# Patient Record
Sex: FEMALE | Race: WHITE | NOT HISPANIC OR LATINO | Employment: FULL TIME | URBAN - METROPOLITAN AREA
[De-identification: names, ages, dates, MRNs, and addresses within clinical notes are randomized per-mention and may not be internally consistent; named-entity substitution may affect disease eponyms.]

---

## 2017-01-19 ENCOUNTER — HOSPITAL ENCOUNTER (OUTPATIENT)
Dept: RADIOLOGY | Facility: OTHER | Age: 37
Discharge: HOME | End: 2017-01-19
Attending: DERMATOLOGY
Payer: COMMERCIAL

## 2017-01-19 DIAGNOSIS — J22 ACUTE LOWER RESPIRATORY INFECTION: ICD-10-CM

## 2017-01-19 PROCEDURE — 71020 XR CHEST PA AND LATERAL: CPT | Mod: TC

## 2017-01-19 PROCEDURE — 71020 XR CHEST PA AND LATERAL: CPT | Mod: 26,,, | Performed by: RADIOLOGY

## 2017-08-01 ENCOUNTER — HOSPITAL ENCOUNTER (EMERGENCY)
Facility: OTHER | Age: 37
Discharge: HOME | End: 2017-08-01
Attending: EMERGENCY MEDICINE
Payer: COMMERCIAL

## 2017-08-01 VITALS
RESPIRATION RATE: 16 BRPM | HEIGHT: 60 IN | SYSTOLIC BLOOD PRESSURE: 135 MMHG | WEIGHT: 150 LBS | OXYGEN SATURATION: 96 % | DIASTOLIC BLOOD PRESSURE: 94 MMHG | HEART RATE: 91 BPM | BODY MASS INDEX: 29.45 KG/M2 | TEMPERATURE: 98 F

## 2017-08-01 DIAGNOSIS — H66.012 ACUTE SUPPURATIVE OTITIS MEDIA OF LEFT EAR WITH SPONTANEOUS RUPTURE OF TYMPANIC MEMBRANE, RECURRENCE NOT SPECIFIED: Primary | ICD-10-CM

## 2017-08-01 LAB
B-HCG UR QL: NEGATIVE
CTP QC/QA: YES

## 2017-08-01 PROCEDURE — 25000003 PHARM REV CODE 250: Performed by: EMERGENCY MEDICINE

## 2017-08-01 PROCEDURE — 99284 EMERGENCY DEPT VISIT MOD MDM: CPT | Mod: 25

## 2017-08-01 PROCEDURE — 81025 URINE PREGNANCY TEST: CPT | Performed by: EMERGENCY MEDICINE

## 2017-08-01 RX ORDER — TRAZODONE HYDROCHLORIDE 100 MG/1
100 TABLET ORAL NIGHTLY
COMMUNITY

## 2017-08-01 RX ORDER — BUPROPION HYDROCHLORIDE 300 MG/1
300 TABLET ORAL DAILY
COMMUNITY

## 2017-08-01 RX ORDER — OXYCODONE AND ACETAMINOPHEN 5; 325 MG/1; MG/1
1 TABLET ORAL EVERY 8 HOURS PRN
Qty: 12 TABLET | Refills: 0 | Status: SHIPPED | OUTPATIENT
Start: 2017-08-01

## 2017-08-01 RX ORDER — IBUPROFEN 800 MG/1
800 TABLET ORAL EVERY 6 HOURS PRN
Qty: 12 TABLET | Refills: 0 | Status: SHIPPED | OUTPATIENT
Start: 2017-08-01

## 2017-08-01 RX ORDER — IBUPROFEN 400 MG/1
800 TABLET ORAL
Status: COMPLETED | OUTPATIENT
Start: 2017-08-01 | End: 2017-08-01

## 2017-08-01 RX ORDER — AMOXICILLIN AND CLAVULANATE POTASSIUM 875; 125 MG/1; MG/1
1 TABLET, FILM COATED ORAL 2 TIMES DAILY
Qty: 20 TABLET | Refills: 0 | Status: SHIPPED | OUTPATIENT
Start: 2017-08-01 | End: 2017-08-11

## 2017-08-01 RX ADMIN — IBUPROFEN 800 MG: 400 TABLET, FILM COATED ORAL at 12:08

## 2017-08-01 NOTE — ED PROVIDER NOTES
Encounter Date: 8/1/2017    SCRIBE #1 NOTE: I, Patricia Worley, am scribing for, and in the presence of, Dr. Saravia.       History     Chief Complaint   Patient presents with    Otalgia     Pt CO pain and bleeding to left ear.     Time seen by provider: 11:56 AM    This is a 36 y.o. female who presents to the ED with complaint of left ear pain that began one week ago. The pain is described as constant, aching and rates a 5/10. She reports associate blood-tinged discharge out of her left ear, as well as decreased hearing, congestion, and rhinorrhea. Patient denies any fever, chills, sore throat, nausea, or vomiting. She also denies any recent trauma or alleviating factors, and states that she has not experienced similar ear pain in the past. She admits to occasional consumption of alcohol.       The history is provided by the patient.     Review of patient's allergies indicates:   Allergen Reactions    Sulfa (sulfonamide antibiotics) Hives     Past Medical History:   Diagnosis Date    Allergy     Cancer 1992    (AML)LEUKEMIA    Eczema     Migraine headache      Past Surgical History:   Procedure Laterality Date    BONE MARROW TRANSPLANT  1992    SINUS SURGERY  12/2008    FESS     History reviewed. No pertinent family history.  Social History   Substance Use Topics    Smoking status: Former Smoker     Types: Cigarettes    Smokeless tobacco: Never Used      Comment: social smoker    Alcohol use Yes      Comment: socially     Review of Systems   Constitutional: Negative for chills and fever.   HENT: Positive for congestion, ear discharge, ear pain (left), hearing loss (left) and rhinorrhea. Negative for sneezing and sore throat.    Respiratory: Negative for cough and shortness of breath.    Cardiovascular: Negative for chest pain.   Gastrointestinal: Negative for abdominal pain, nausea and vomiting.   Genitourinary: Negative for dysuria.   Musculoskeletal: Negative for back pain and myalgias.   Skin: Negative  for rash.   Neurological: Negative for weakness.   Hematological: Does not bruise/bleed easily.       Physical Exam     Initial Vitals [08/01/17 1123]   BP Pulse Resp Temp SpO2   133/76 100 18 97.4 °F (36.3 °C) 99 %      MAP       95         Physical Exam    Nursing note and vitals reviewed.  Constitutional: She appears well-developed and well-nourished. She is not diaphoretic. No distress.   HENT:   Head: Normocephalic and atraumatic.   Right Ear: Tympanic membrane and ear canal normal.   Left Ear: Ear canal normal.   Mouth/Throat: Oropharynx is clear and moist.   Left TM: Ruptured.    Eyes: Conjunctivae and EOM are normal. Pupils are equal, round, and reactive to light. No scleral icterus.   Neck: Normal range of motion. Neck supple.   Cardiovascular: Normal rate, regular rhythm, S1 normal, S2 normal and normal heart sounds. Exam reveals no gallop and no friction rub.    No murmur heard.  Pulmonary/Chest: Breath sounds normal. No respiratory distress. She has no wheezes. She has no rhonchi. She has no rales.   Abdominal: Soft. Bowel sounds are normal. There is no tenderness. There is no rebound and no guarding.   Musculoskeletal: Normal range of motion. She exhibits no edema or tenderness.   No lower extremity edema.    Lymphadenopathy:     She has no cervical adenopathy.   Neurological: She is alert and oriented to person, place, and time.   Skin: Skin is warm and dry. No rash noted. No pallor.   Psychiatric: She has a normal mood and affect. Her behavior is normal. Judgment and thought content normal.         ED Course   Procedures  Labs Reviewed   POCT URINE PREGNANCY             Medical Decision Making:   Clinical Tests:   Lab Tests: Ordered and Reviewed            Scribe Attestation:   Scribe #1: I performed the above scribed service and the documentation accurately describes the services I performed. I attest to the accuracy of the note.    Attending Attestation:           Physician Attestation for  Scribe:  Physician Attestation Statement for Scribe #1: I, Dr. Alvarado, reviewed documentation, as scribed by Patricia Worley in my presence, and it is both accurate and complete.         Attending ED Notes:   Urgent evaluation a 36-year-old female with left ear pain with drainage.  Patient is afebrile, nontoxic-appearing with stable vital signs.  On examination patient has ruptured tympanic membrane secondary to otitis media.  The patient is extensively counseled on her diagnosis and treatment, discharged in good condition and directed follow-up his PCP and ENT in the next 24-48 hours.          ED Course     Clinical Impression:     1. Acute suppurative otitis media of left ear with spontaneous rupture of tympanic membrane, recurrence not specified                                 Ti Alvarado MD  08/01/17 2032

## 2017-08-01 NOTE — ED TRIAGE NOTES
Pt c/o left ear pain which started this morning with sanguinous drainage.Pt reports hearing decreased in the left ear. Pt reports that she has had cold symptoms X 1 week.

## 2017-08-01 NOTE — ED NOTES
Patient Identifiers for Lucy Tubbs checked and correct  LOC: The patient is awake, alert and aware of environment with an appropriate affect, the patient is oriented x 3 and speaking appropriate.  APPEARANCE: Patient resting comfortably and in no acute distress. The patient is clean and well groomed. The patient's clothing is properly fastened.  SKIN: The skin is warm and dry. The patient has normal skin turgor and moist mucus membranes.   Musculoskeletal :  Normal range of motion noted. Moves all extremities well  RESPIRATORY: Airway is open and patent, respirations are spontaneous, patient has a normal effort and rate. Breath sounds are clear & equal, bilaterally.  PULSES: 2+ radial pulses, symmetrical in all extremities.  ENT: Right TM intact. Left TM ruputed.    Will continue to monitor

## 2018-06-15 ENCOUNTER — HOSPITAL ENCOUNTER (OUTPATIENT)
Dept: RADIOLOGY | Facility: OTHER | Age: 38
Discharge: HOME | End: 2018-06-15
Attending: INTERNAL MEDICINE
Payer: COMMERCIAL

## 2018-06-15 DIAGNOSIS — R13.10 DYSPHAGIA, UNSPECIFIED: ICD-10-CM

## 2018-06-15 PROCEDURE — 74220 X-RAY XM ESOPHAGUS 1CNTRST: CPT | Mod: TC

## 2018-06-15 PROCEDURE — 74220 X-RAY XM ESOPHAGUS 1CNTRST: CPT | Mod: 26,,, | Performed by: RADIOLOGY

## 2024-11-14 ENCOUNTER — OFFICE VISIT (OUTPATIENT)
Dept: PRIMARY CARE CLINIC | Facility: CLINIC | Age: 44
End: 2024-11-14
Payer: COMMERCIAL

## 2024-11-14 VITALS
WEIGHT: 147.94 LBS | SYSTOLIC BLOOD PRESSURE: 132 MMHG | DIASTOLIC BLOOD PRESSURE: 80 MMHG | HEART RATE: 88 BPM | BODY MASS INDEX: 29.04 KG/M2 | HEIGHT: 60 IN | OXYGEN SATURATION: 98 % | RESPIRATION RATE: 16 BRPM

## 2024-11-14 DIAGNOSIS — Z12.31 ENCOUNTER FOR SCREENING MAMMOGRAM FOR MALIGNANT NEOPLASM OF BREAST: ICD-10-CM

## 2024-11-14 DIAGNOSIS — Z13.5 SCREENING FOR EYE CONDITION: ICD-10-CM

## 2024-11-14 DIAGNOSIS — Z11.4 SCREENING FOR HIV (HUMAN IMMUNODEFICIENCY VIRUS): ICD-10-CM

## 2024-11-14 DIAGNOSIS — Z94.81 S/P AUTOLOGOUS BONE MARROW TRANSPLANTATION: ICD-10-CM

## 2024-11-14 DIAGNOSIS — K21.9 GASTROESOPHAGEAL REFLUX DISEASE WITHOUT ESOPHAGITIS: ICD-10-CM

## 2024-11-14 DIAGNOSIS — Z13.6 ENCOUNTER FOR LIPID SCREENING FOR CARDIOVASCULAR DISEASE: ICD-10-CM

## 2024-11-14 DIAGNOSIS — Z00.00 ENCOUNTER FOR MEDICAL EXAMINATION TO ESTABLISH CARE: Primary | ICD-10-CM

## 2024-11-14 DIAGNOSIS — Z13.220 ENCOUNTER FOR LIPID SCREENING FOR CARDIOVASCULAR DISEASE: ICD-10-CM

## 2024-11-14 DIAGNOSIS — C92.01 ACUTE MYELOID LEUKEMIA IN REMISSION: ICD-10-CM

## 2024-11-14 DIAGNOSIS — Z13.1 SCREENING FOR DIABETES MELLITUS: ICD-10-CM

## 2024-11-14 DIAGNOSIS — Z11.59 ENCOUNTER FOR HEPATITIS C SCREENING TEST FOR LOW RISK PATIENT: ICD-10-CM

## 2024-11-14 DIAGNOSIS — Z12.4 SCREENING FOR CERVICAL CANCER: ICD-10-CM

## 2024-11-14 PROCEDURE — 3079F DIAST BP 80-89 MM HG: CPT | Mod: CPTII,S$GLB,, | Performed by: NURSE PRACTITIONER

## 2024-11-14 PROCEDURE — 99386 PREV VISIT NEW AGE 40-64: CPT | Mod: S$GLB,,, | Performed by: NURSE PRACTITIONER

## 2024-11-14 PROCEDURE — 99999 PR PBB SHADOW E&M-NEW PATIENT-LVL IV: CPT | Mod: PBBFAC,,, | Performed by: NURSE PRACTITIONER

## 2024-11-14 PROCEDURE — 3075F SYST BP GE 130 - 139MM HG: CPT | Mod: CPTII,S$GLB,, | Performed by: NURSE PRACTITIONER

## 2024-11-14 PROCEDURE — 1159F MED LIST DOCD IN RCRD: CPT | Mod: CPTII,S$GLB,, | Performed by: NURSE PRACTITIONER

## 2024-11-14 PROCEDURE — 1160F RVW MEDS BY RX/DR IN RCRD: CPT | Mod: CPTII,S$GLB,, | Performed by: NURSE PRACTITIONER

## 2024-11-14 PROCEDURE — 3008F BODY MASS INDEX DOCD: CPT | Mod: CPTII,S$GLB,, | Performed by: NURSE PRACTITIONER

## 2024-11-14 RX ORDER — ESCITALOPRAM OXALATE 10 MG/1
10 TABLET ORAL DAILY
COMMUNITY

## 2024-11-14 RX ORDER — PANTOPRAZOLE SODIUM 40 MG/1
40 TABLET, DELAYED RELEASE ORAL DAILY
Qty: 90 TABLET | Refills: 3 | Status: SHIPPED | OUTPATIENT
Start: 2024-11-14 | End: 2025-11-14

## 2024-11-14 NOTE — PROGRESS NOTES
[facility] Note    Chief Complaint      Chief Complaint   Patient presents with    Establish Care     History of Present Illness      Lucy Tubbs is a 44 y.o. female patient with chronic conditions of anxiety, ADHD who presents today for [facility] care  Works for [employer]  In derm- MOS    Labs- ordered  Eye exams- order  Gyn- order  Mammogram- order    Vaccines:    History of Present Illness    CHIEF COMPLAINT:  Lucy presents for an annual wellness visit and to establish care with a new provider after switching insurance.    HPI:  Lucy started care on  after switching insurance and losing her previous provider. She has ovarian failure due to leukemia treatment as a child, has never had spontaneous menstruation, and required hormonal contraception to induce menstrual cycles. She is unable to conceive children. Lucy is currently taking Xanax, Adderall, and Lexapro. Her previous provider discontinued Wellbutrin and initiated Lexapro, which causes hot flashes. Lucy prefers Wellbutrin, which she had been taking for an extended period. Her providers were concerned about her blood pressure, which she attributes to stress. Her current blood pressure is 132/80. Lucy also takes pantoprazole 40 mg.    Lucy denies any current menstrual problems, urination issues, or bowel movement concerns.    MEDICATIONS:  Lucy is on Xanax and Adderall. She is also taking Lexapro, reporting hot flashes as a side effect. She is on Pantoprazole 40 mg.    MEDICAL HISTORY:  Lucy has a history of childhood leukemia. She also has ovarian failure since childhood, which is due to her leukemia treatment. Lucy has never had spontaneous menstruation. Her last menstrual period is unknown. She is in menopause due to ovarian failure. Lucy has not had a hysterectomy. A referral has been placed for a gynecologist for her last Pap smear. She has previously used hormonal contraception to induce menstruation. Lucy is  and  is unable to conceive due to ovarian failure. She has never been on hormone replacement therapy as it is contraindicated due to ovarian failure from childhood leukemia treatment. Lucy has received a flu vaccine and reports being up to date on vaccinations.    FAMILY HISTORY:  Family history is significant for mother with Alzheimer's disease.    ALLERGIES:  Lucy is allergic to Sulfa.    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 She recently , with her spouse leaving in January.      ROS:  General: -change in weight  Genitourinary: -difficulty urinating  Endocrine: +hot flashes         Health Maintenance   Topic Date Due    Hepatitis C Screening  Never done    Mammogram  Never done    TETANUS VACCINE  07/10/2011    Lipid Panel  Completed       Past Medical History:   Diagnosis Date    Allergy     Cancer 1992    (AML)LEUKEMIA    Eczema     Migraine headache        Past Surgical History:   Procedure Laterality Date    BONE MARROW TRANSPLANT  1992    SINUS SURGERY  12/2008    FESS       family history is not on file.    Social History     Tobacco Use    Smoking status: Former     Types: Cigarettes    Smokeless tobacco: Never    Tobacco comments:     social smoker   Substance Use Topics    Alcohol use: Yes     Comment: socially    Drug use: No       Outpatient Encounter Medications as of 11/14/2024   Medication Sig Dispense Refill    ALPRAZolam (XANAX) 0.5 MG tablet Take 1 tablet (0.5 mg total) by mouth once daily. 30 tablet 0    dextroamphetamine-amphetamine (ADDERALL XR) 25 MG 24 hr capsule Take 1 capsule (25 mg total) by mouth every morning. 30 capsule 0    dextroamphetamine-amphetamine (ADDERALL) 15 mg tablet Take 1 tablet (15 mg total) by mouth 2 (two) times a day. 30 tablet 0    dextroamphetamine-amphetamine (ADDERALL) 15 mg tablet Take 1 tablet (15 mg total) by mouth 2 (two) times a day. 30 tablet 0    EScitalopram oxalate (LEXAPRO) 10 MG tablet Take 10 mg by mouth once  daily.      buPROPion (WELLBUTRIN XL) 300 MG 24 hr tablet Take 300 mg by mouth once daily. (Patient not taking: Reported on 11/14/2024)      pantoprazole (PROTONIX) 40 MG tablet Take 1 tablet (40 mg total) by mouth once daily. 90 tablet 3    [DISCONTINUED] acyclovir (ZOVIRAX) 400 MG tablet Take 1 tablet (400 mg total) by mouth 3 (three) times daily for 5 days 15 tablet 4    [DISCONTINUED] cetirizine-pseudoephedrine (ZYRTEC-D) 5-120 mg Tb12 Take 1 tablet by mouth once daily.      [DISCONTINUED] fluticasone (FLONASE) 50 mcg/actuation nasal spray 2 sprays by Each Nare route daily as needed for Rhinitis.      [DISCONTINUED] ibuprofen (ADVIL,MOTRIN) 800 MG tablet Take 1 tablet (800 mg total) by mouth every 6 (six) hours as needed for Pain. 12 tablet 0    [DISCONTINUED] multivitamin capsule Take 1 capsule by mouth once daily.      [DISCONTINUED] oxycodone-acetaminophen (PERCOCET) 5-325 mg per tablet Take 1 tablet by mouth every 8 (eight) hours as needed for Pain. 12 tablet 0    [DISCONTINUED] trazodone (DESYREL) 100 MG tablet Take 100 mg by mouth every evening.       No facility-administered encounter medications on file as of 11/14/2024.        Review of patient's allergies indicates:   Allergen Reactions    Sulfa (sulfonamide antibiotics) Hives       Physical Exam      Vital Signs  Pulse: 88  Resp: 16  SpO2: 98 %  BP: 132/80  BP Location: Right arm  Patient Position: Sitting  Height and Weight  Height: 5' (152.4 cm)  Weight: 67.1 kg (147 lb 14.9 oz)  BSA (Calculated - sq m): 1.69 sq meters  BMI (Calculated): 28.9  Weight in (lb) to have BMI = 25: 127.7    Physical Exam  Vitals and nursing note reviewed.   Constitutional:       General: She is not in acute distress.     Appearance: Normal appearance. She is well-developed. She is not ill-appearing.   HENT:      Head: Normocephalic and atraumatic.      Right Ear: External ear normal.      Left Ear: External ear normal.   Eyes:      Conjunctiva/sclera: Conjunctivae normal.       Pupils: Pupils are equal, round, and reactive to light.   Neck:      Thyroid: No thyromegaly.      Vascular: No JVD.      Trachea: No tracheal deviation.   Cardiovascular:      Rate and Rhythm: Normal rate and regular rhythm.      Heart sounds: Normal heart sounds. No murmur heard.  Pulmonary:      Effort: Pulmonary effort is normal.      Breath sounds: Normal breath sounds.   Chest:      Chest wall: No tenderness.   Abdominal:      General: Bowel sounds are normal.      Palpations: Abdomen is soft.      Tenderness: There is no abdominal tenderness. There is no guarding.   Musculoskeletal:         General: Normal range of motion.      Cervical back: Normal range of motion and neck supple.   Lymphadenopathy:      Cervical: No cervical adenopathy.   Skin:     General: Skin is warm and dry.   Neurological:      General: No focal deficit present.      Mental Status: She is alert and oriented to person, place, and time.   Psychiatric:         Mood and Affect: Mood normal.         Behavior: Behavior normal.         Thought Content: Thought content normal.         Judgment: Judgment normal.          Laboratory:  CBC:  Lab Results   Component Value Date    WBC 8.15 04/18/2016    RBC 4.07 04/18/2016    HGB 12.6 04/18/2016    HCT 38.6 04/18/2016     04/18/2016    MCV 95 04/18/2016    MCH 31.0 04/18/2016    MCHC 32.6 04/18/2016    MCHC 33.8 06/20/2014    MCHC 32.9 12/22/2008     CMP:  Lab Results   Component Value Date    GLU 75 08/26/2022    CALCIUM 9.6 08/26/2022    ALBUMIN 4.6 08/26/2022    PROT 6.9 04/18/2016     08/26/2022    K 4.9 08/26/2022    CO2 25 08/26/2022     04/18/2016    BUN 17 08/26/2022    ALKPHOS 52 08/26/2022    ALT 34 (H) 08/26/2022    AST 35 (H) 08/26/2022    BILITOT 0.4 08/26/2022    BILITOT 0.3 04/18/2016    BILITOT 0.5 06/20/2014     URINALYSIS:  Lab Results   Component Value Date    COLORU Yellow 04/18/2016    SPECGRAV <=1.005 (A) 04/18/2016    PHUR 6.0 04/18/2016    PROTEINUA  Negative 04/18/2016    NITRITE Negative 04/18/2016    LEUKOCYTESUR Negative 04/18/2016    UROBILINOGEN Negative 04/18/2016      LIPIDS:  Lab Results   Component Value Date    TSH 1.447 06/20/2014     TSH:  Lab Results   Component Value Date    TSH 1.447 06/20/2014     A1C:  Lab Results   Component Value Date    HGBA1C 5.4 05/12/2021         Assessment/Plan     Lucy Tubbs is a 44 y.o.female with:    Assessment & Plan    Ordered comprehensive lab panel including CBC, CMP, A1C, Hep C, HIV, and thyroid tests to assess overall health status  Recommend first mammogram given patient's age and lack of prior screening  Considered adjusting psychiatric medications based on patient's report of adverse effects from Lexapro and preference for Wellbutrin  Noted patient's history of ovarian failure due to childhood leukemia treatment  Assessed blood pressure as normal at 132/80, despite previous concerns    GASTRO-ESOPHAGEAL REFLUX DISEASE:  Started pantoprazole 40 mg.    SCREENING MAMMOGRAM:  Explained mammogram procedure and advised to inform technician it is the first screening.  Ordered mammogram.    GYNECOLOGICAL EXAMINATION:  Referred to Gynecology.    GENERAL HEALTH SCREENING:  Ordered CBC, CMP, A1C, Hep C, HIV, and thyroid tests.  Fast for 8 hours before lab work, stopping food/drink intake around 10 PM.  Lucy may have water before lab work.  Follow up for fasting lab work tomorrow morning at 7 or 8 AM.  Check in at the  on the first floor for lab work, no appointment needed.    OPHTHALMOLOGICAL EXAMINATION:  Referred to Ophthalmology.    GENERAL INSTRUCTIONS:  Discussed potential for food options to improve once mall area opens.  Contact office through patient portal for any needs.         Encounter for medical examination to establish care  -     CBC Auto Differential; Future; Expected date: 11/14/2024  -     Comprehensive Metabolic Panel; Future; Expected date: 11/14/2024  -     Hemoglobin A1C; Future;  Expected date: 11/14/2024  -     Hepatitis C Antibody; Future; Expected date: 11/14/2024  -     HIV 1/2 Ag/Ab (4th Gen); Future; Expected date: 11/14/2024  -     TSH; Future; Expected date: 11/14/2024  -     T4, Free; Future; Expected date: 11/14/2024  -     Lipid Panel; Future; Expected date: 11/14/2024    Gastroesophageal reflux disease without esophagitis  -     CBC Auto Differential; Future; Expected date: 11/14/2024  -     Comprehensive Metabolic Panel; Future; Expected date: 11/14/2024  -     Hemoglobin A1C; Future; Expected date: 11/14/2024  -     Hepatitis C Antibody; Future; Expected date: 11/14/2024  -     HIV 1/2 Ag/Ab (4th Gen); Future; Expected date: 11/14/2024  -     TSH; Future; Expected date: 11/14/2024  -     T4, Free; Future; Expected date: 11/14/2024  -     Lipid Panel; Future; Expected date: 11/14/2024  -     pantoprazole (PROTONIX) 40 MG tablet; Take 1 tablet (40 mg total) by mouth once daily.  Dispense: 90 tablet; Refill: 3    Acute myeloid leukemia in remission  -     CBC Auto Differential; Future; Expected date: 11/14/2024  -     Comprehensive Metabolic Panel; Future; Expected date: 11/14/2024  -     Hemoglobin A1C; Future; Expected date: 11/14/2024  -     Hepatitis C Antibody; Future; Expected date: 11/14/2024  -     HIV 1/2 Ag/Ab (4th Gen); Future; Expected date: 11/14/2024  -     TSH; Future; Expected date: 11/14/2024  -     T4, Free; Future; Expected date: 11/14/2024  -     Lipid Panel; Future; Expected date: 11/14/2024    S/P autologous bone marrow transplantation  -     CBC Auto Differential; Future; Expected date: 11/14/2024  -     Comprehensive Metabolic Panel; Future; Expected date: 11/14/2024  -     Hemoglobin A1C; Future; Expected date: 11/14/2024  -     Hepatitis C Antibody; Future; Expected date: 11/14/2024  -     HIV 1/2 Ag/Ab (4th Gen); Future; Expected date: 11/14/2024  -     TSH; Future; Expected date: 11/14/2024  -     T4, Free; Future; Expected date: 11/14/2024  -     Lipid  Panel; Future; Expected date: 11/14/2024    Screening for eye condition  -     CBC Auto Differential; Future; Expected date: 11/14/2024  -     Comprehensive Metabolic Panel; Future; Expected date: 11/14/2024  -     Hemoglobin A1C; Future; Expected date: 11/14/2024  -     Hepatitis C Antibody; Future; Expected date: 11/14/2024  -     HIV 1/2 Ag/Ab (4th Gen); Future; Expected date: 11/14/2024  -     TSH; Future; Expected date: 11/14/2024  -     T4, Free; Future; Expected date: 11/14/2024  -     Lipid Panel; Future; Expected date: 11/14/2024  -     Ambulatory referral/consult to Optometry; Future; Expected date: 11/14/2024    Screening for cervical cancer  -     Ambulatory referral/consult to Gynecology; Future; Expected date: 11/14/2024    Encounter for screening mammogram for malignant neoplasm of breast  -     Mammo Digital Screening Bilat w/ Bert; Future; Expected date: 11/14/2024    Screening for diabetes mellitus  -     CBC Auto Differential; Future; Expected date: 11/14/2024  -     Comprehensive Metabolic Panel; Future; Expected date: 11/14/2024  -     Hemoglobin A1C; Future; Expected date: 11/14/2024  -     Hepatitis C Antibody; Future; Expected date: 11/14/2024  -     HIV 1/2 Ag/Ab (4th Gen); Future; Expected date: 11/14/2024  -     TSH; Future; Expected date: 11/14/2024  -     T4, Free; Future; Expected date: 11/14/2024  -     Lipid Panel; Future; Expected date: 11/14/2024    Encounter for hepatitis C screening test for low risk patient  -     CBC Auto Differential; Future; Expected date: 11/14/2024  -     Comprehensive Metabolic Panel; Future; Expected date: 11/14/2024  -     Hemoglobin A1C; Future; Expected date: 11/14/2024  -     Hepatitis C Antibody; Future; Expected date: 11/14/2024  -     HIV 1/2 Ag/Ab (4th Gen); Future; Expected date: 11/14/2024  -     TSH; Future; Expected date: 11/14/2024  -     T4, Free; Future; Expected date: 11/14/2024  -     Lipid Panel; Future; Expected date:  11/14/2024    Screening for HIV (human immunodeficiency virus)  -     CBC Auto Differential; Future; Expected date: 11/14/2024  -     Comprehensive Metabolic Panel; Future; Expected date: 11/14/2024  -     Hemoglobin A1C; Future; Expected date: 11/14/2024  -     Hepatitis C Antibody; Future; Expected date: 11/14/2024  -     HIV 1/2 Ag/Ab (4th Gen); Future; Expected date: 11/14/2024  -     TSH; Future; Expected date: 11/14/2024  -     T4, Free; Future; Expected date: 11/14/2024  -     Lipid Panel; Future; Expected date: 11/14/2024    Encounter for lipid screening for cardiovascular disease  -     CBC Auto Differential; Future; Expected date: 11/14/2024  -     Comprehensive Metabolic Panel; Future; Expected date: 11/14/2024  -     Hemoglobin A1C; Future; Expected date: 11/14/2024  -     Hepatitis C Antibody; Future; Expected date: 11/14/2024  -     HIV 1/2 Ag/Ab (4th Gen); Future; Expected date: 11/14/2024  -     TSH; Future; Expected date: 11/14/2024  -     T4, Free; Future; Expected date: 11/14/2024  -     Lipid Panel; Future; Expected date: 11/14/2024          Health Maintenance Due   Topic Date Due    Hepatitis C Screening  Never done    HIV Screening  Never done    Mammogram  Never done    TETANUS VACCINE  07/10/2011    Pneumococcal Vaccines (Age 0-64) (2 of 2 - PCV) 06/26/2015    COVID-19 Vaccine (3 - Pfizer risk series) 01/07/2022        I spent 37 minutes on the day of this encounter for preparing for, evaluating, treating, and managing this patient.      -Continue current medications and maintain follow up with specialists.  Return to clinic in 1 year sooner for any concerns No follow-ups on file.     This note was generated with the assistance of ambient listening technology. Verbal consent was obtained by the patient and accompanying visitor(s) for the recording of patient appointment to facilitate this note. I attest to having reviewed and edited the generated note for accuracy, though some syntax or  spelling errors may persist. Please contact the author of this note for any clarification.        YANELY Yanez  [facility] location

## 2024-11-19 ENCOUNTER — PATIENT MESSAGE (OUTPATIENT)
Dept: OPHTHALMOLOGY | Facility: CLINIC | Age: 44
End: 2024-11-19
Payer: COMMERCIAL

## 2024-11-21 ENCOUNTER — PATIENT MESSAGE (OUTPATIENT)
Dept: OPHTHALMOLOGY | Facility: CLINIC | Age: 44
End: 2024-11-21
Payer: COMMERCIAL

## 2024-11-21 PROBLEM — Z94.81 S/P AUTOLOGOUS BONE MARROW TRANSPLANTATION: Status: ACTIVE | Noted: 2024-11-21

## 2024-11-21 PROBLEM — C92.01 ACUTE MYELOID LEUKEMIA IN REMISSION: Status: ACTIVE | Noted: 2024-11-21

## 2024-12-05 ENCOUNTER — PATIENT MESSAGE (OUTPATIENT)
Dept: ADMINISTRATIVE | Facility: OTHER | Age: 44
End: 2024-12-05
Payer: COMMERCIAL

## 2024-12-15 ENCOUNTER — PATIENT MESSAGE (OUTPATIENT)
Dept: PRIMARY CARE CLINIC | Facility: CLINIC | Age: 44
End: 2024-12-15
Payer: COMMERCIAL

## 2024-12-16 ENCOUNTER — PATIENT MESSAGE (OUTPATIENT)
Dept: ADMINISTRATIVE | Facility: HOSPITAL | Age: 44
End: 2024-12-16
Payer: COMMERCIAL

## 2024-12-18 ENCOUNTER — PATIENT OUTREACH (OUTPATIENT)
Dept: ADMINISTRATIVE | Facility: HOSPITAL | Age: 44
End: 2024-12-18
Payer: COMMERCIAL

## 2024-12-18 NOTE — PROGRESS NOTES
Health Maintenance Due   Topic Date Due    Hepatitis C Screening  Never done    HIV Screening  Never done    Mammogram  Never done    TETANUS VACCINE  07/10/2011    Pneumococcal Vaccines (Age 0-64) (2 of 2 - PCV) 06/26/2015    COVID-19 Vaccine (3 - Pfizer risk series) 01/07/2022     Chart review completed.  Updated. Triggered Links. Immunizations reviewed and updated. Care Everywhere Updated. Care Team Updated.  Mammogram scheduled.

## 2024-12-19 RX ORDER — ACYCLOVIR 400 MG/1
400 TABLET ORAL 3 TIMES DAILY
Qty: 15 TABLET | Refills: 6 | Status: SHIPPED | OUTPATIENT
Start: 2024-12-19